# Patient Record
Sex: FEMALE | Race: WHITE | HISPANIC OR LATINO
[De-identification: names, ages, dates, MRNs, and addresses within clinical notes are randomized per-mention and may not be internally consistent; named-entity substitution may affect disease eponyms.]

---

## 2022-01-05 PROBLEM — Z00.00 ENCOUNTER FOR PREVENTIVE HEALTH EXAMINATION: Status: ACTIVE | Noted: 2022-01-05

## 2022-01-14 ENCOUNTER — APPOINTMENT (OUTPATIENT)
Dept: SPINE | Facility: CLINIC | Age: 72
End: 2022-01-14
Payer: MEDICAID

## 2022-01-14 VITALS
HEIGHT: 66 IN | BODY MASS INDEX: 30.86 KG/M2 | HEART RATE: 75 BPM | SYSTOLIC BLOOD PRESSURE: 125 MMHG | DIASTOLIC BLOOD PRESSURE: 82 MMHG | WEIGHT: 192 LBS | OXYGEN SATURATION: 93 %

## 2022-01-14 DIAGNOSIS — Z87.898 PERSONAL HISTORY OF OTHER SPECIFIED CONDITIONS: ICD-10-CM

## 2022-01-14 DIAGNOSIS — Z77.22 CONTACT WITH AND (SUSPECTED) EXPOSURE TO ENVIRONMENTAL TOBACCO SMOKE (ACUTE) (CHRONIC): ICD-10-CM

## 2022-01-14 DIAGNOSIS — G89.29 PAIN IN LEFT ANKLE AND JOINTS OF LEFT FOOT: ICD-10-CM

## 2022-01-14 DIAGNOSIS — Z86.79 PERSONAL HISTORY OF OTHER DISEASES OF THE CIRCULATORY SYSTEM: ICD-10-CM

## 2022-01-14 DIAGNOSIS — R20.0 ANESTHESIA OF SKIN: ICD-10-CM

## 2022-01-14 DIAGNOSIS — Z86.2 PERSONAL HISTORY OF DISEASES OF THE BLOOD AND BLOOD-FORMING ORGANS AND CERTAIN DISORDERS INVOLVING THE IMMUNE MECHANISM: ICD-10-CM

## 2022-01-14 DIAGNOSIS — Z87.19 PERSONAL HISTORY OF OTHER DISEASES OF THE DIGESTIVE SYSTEM: ICD-10-CM

## 2022-01-14 DIAGNOSIS — Z82.0 FAMILY HISTORY OF EPILEPSY AND OTHER DISEASES OF THE NERVOUS SYSTEM: ICD-10-CM

## 2022-01-14 DIAGNOSIS — Z82.3 FAMILY HISTORY OF STROKE: ICD-10-CM

## 2022-01-14 DIAGNOSIS — M25.572 PAIN IN LEFT ANKLE AND JOINTS OF LEFT FOOT: ICD-10-CM

## 2022-01-14 DIAGNOSIS — Z87.39 PERSONAL HISTORY OF OTHER DISEASES OF THE MUSCULOSKELETAL SYSTEM AND CONNECTIVE TISSUE: ICD-10-CM

## 2022-01-14 DIAGNOSIS — M54.16 RADICULOPATHY, LUMBAR REGION: ICD-10-CM

## 2022-01-14 DIAGNOSIS — Z86.19 PERSONAL HISTORY OF OTHER INFECTIOUS AND PARASITIC DISEASES: ICD-10-CM

## 2022-01-14 DIAGNOSIS — Z82.49 FAMILY HISTORY OF ISCHEMIC HEART DISEASE AND OTHER DISEASES OF THE CIRCULATORY SYSTEM: ICD-10-CM

## 2022-01-14 PROCEDURE — 99203 OFFICE O/P NEW LOW 30 MIN: CPT

## 2022-01-14 RX ORDER — ALENDRONATE SODIUM 70 MG/1
70 TABLET ORAL
Refills: 0 | Status: ACTIVE | COMMUNITY

## 2022-01-14 RX ORDER — NAPROXEN SODIUM 220 MG
220 TABLET ORAL
Refills: 0 | Status: ACTIVE | COMMUNITY

## 2022-01-14 NOTE — CONSULT LETTER
[Dear  ___] : Dear  [unfilled], [Please see my note below.] : Please see my note below. [FreeTextEntry2] : Dr. Bridget Sales [FreeTextEntry3] : Kinza Curry MS, ANP-BC, SCRN\par Board Certified Adult Nurse Practitioner on Behalf of Dr. Jass Lea\par  Neurosurgeon\par Department Neurosurgery\par \par

## 2022-01-14 NOTE — HISTORY OF PRESENT ILLNESS
[FreeTextEntry1] : Lowerback pain [de-identified] : Ms. Rony Mary is a 71 year old woman presenting with a PMHx osteoporosis, liver cirrhosis, splenomegaly, HepatitisC, PVD, thrombocytopenia, who comes for Lumbar spine evaluation. She She states that he symptoms started in 2019 with lowerback pain. She was evaluated at [facility] and seen and evaluated by Dr. Joaquim De La Rosa. He diagnosed her with L4-L5 Herniated disc and recommended conservative management. She underwent Steroid injection with relief for 6 months. In summer of 2020 her symptoms of lowerback pain returned and she delayed follow up until now. Today she reports persistent lowerback pain with intermittent numbness in her legs bilaterally worse on Left side that is most bothersome in the AM. The pain increases with Lifting and bending. and diminishes with sitting. She takes Advil as needed. In addition, she report left ankle pain and is concerned about a left ankle fracture due to an episode of banging her foot and a history of osteoporosis. She has not had PT within 6 weeks.\par \par She lives with her family and she  speaks Swedish. He daughter servers as the  for the visit. They refused  services\par She is currently working a Home attendant and reports that her job involves heavy lifting and strenuous activities.\par \par Her neurological exam is normal\par \par \par \par Difficulty walking up stairs\par \par No PT in the past 6 weeks\par \par Still working as work as Home health AID\par \par Pain better with lifting and bending\par Aleve for pain as needed\par \par

## 2022-01-14 NOTE — ASSESSMENT
[FreeTextEntry1] : Ms. Rony Mary is a 71 year old woman presenting with excerebration of chronic  lowerback pain with intermittent numbness in feet. Lumbar MRI to assess for Lumbar herniated disc/ Stenosis. Lumbar Xray to assess for instability. LEft ankle Xray to r/o Fracture.  She will begin PT for core strengthening exercise. She will return in 6 weeks to see Dr. Lea.\par

## 2022-01-14 NOTE — PHYSICAL EXAM
[General Appearance - Alert] : alert [General Appearance - In No Acute Distress] : in no acute distress [Oriented To Time, Place, And Person] : oriented to person, place, and time [Cranial Nerves Optic (II)] : visual acuity intact bilaterally,  pupils equal round and reactive to light [Cranial Nerves Oculomotor (III)] : extraocular motion intact [Cranial Nerves Trigeminal (V)] : facial sensation intact symmetrically [Cranial Nerves Facial (VII)] : face symmetrical [Cranial Nerves Vestibulocochlear (VIII)] : hearing was intact bilaterally [Cranial Nerves Glossopharyngeal (IX)] : tongue and palate midline [Cranial Nerves Accessory (XI - Cranial And Spinal)] : head turning and shoulder shrug symmetric [Cranial Nerves Hypoglossal (XII)] : there was no tongue deviation with protrusion [] : no respiratory distress [Respiration, Rhythm And Depth] : normal respiratory rhythm and effort [Heart Rate And Rhythm] : heart rate was normal and rhythm regular [Abnormal Walk] : normal gait [No Visual Abnormalities] : no visible abnormailities [Normal] : normal [Able to toe walk] : the patient was able to toe walk [Able to heel walk] : the patient was able to heel walk [Sclera] : the sclera and conjunctiva were normal [Outer Ear] : the ears and nose were normal in appearance [Neck Appearance] : the appearance of the neck was normal [Straight-Leg Raise Test - Left] : straight leg raise of the left leg was negative [Straight-Leg Raise Test - Right] : straight leg raise  of the right leg was negative

## 2022-02-03 ENCOUNTER — TRANSCRIPTION ENCOUNTER (OUTPATIENT)
Age: 72
End: 2022-02-03

## 2022-03-03 ENCOUNTER — APPOINTMENT (OUTPATIENT)
Dept: SPINE | Facility: CLINIC | Age: 72
End: 2022-03-03
Payer: MEDICAID

## 2022-03-03 VITALS
WEIGHT: 192 LBS | HEIGHT: 66 IN | DIASTOLIC BLOOD PRESSURE: 85 MMHG | SYSTOLIC BLOOD PRESSURE: 133 MMHG | BODY MASS INDEX: 30.86 KG/M2 | OXYGEN SATURATION: 93 % | HEART RATE: 80 BPM

## 2022-03-03 DIAGNOSIS — M54.9 DORSALGIA, UNSPECIFIED: ICD-10-CM

## 2022-03-03 DIAGNOSIS — G89.29 DORSALGIA, UNSPECIFIED: ICD-10-CM

## 2022-03-03 PROCEDURE — 99212 OFFICE O/P EST SF 10 MIN: CPT

## 2022-03-03 NOTE — DATA REVIEWED
[de-identified] : Lumbar spine from [facility] on 3/3/2022 [de-identified] : [facility]

## 2022-03-03 NOTE — ASSESSMENT
[FreeTextEntry1] : No surgical interventions needed. Recommend OTC pain medication after approval. If pain persist f/u with pain management specialist. PRN

## 2022-03-03 NOTE — REASON FOR VISIT
[Follow-Up: _____] : a [unfilled] follow-up visit [Other: _____] : [unfilled] [FreeTextEntry1] : Ms. Rony Mary is here today for with three year history of lower back pain. Denies pain radiating down legs or weakness. She had one LESI in 2017  MRI of Lumbar spine and Xray reviwed today. Flexion extension x-rays did not show any dynamic instability. The ankle x-rays did not show any acute traumatic fissures. A lumbar MRI scan reveals L4-5 stenosis however the patient has no leg pain and low back pain is mild. She's not taking any medication. She denies any numbness tingling or weakness, bowel or bladder dysfunction.\par \par She speaks [place] and refused  services. He daughter servers as the  for the visit.

## 2022-03-03 NOTE — PHYSICAL EXAM
[Motor Strength] : muscle strength was normal in all four extremities [Sensation Tactile Decrease] : light touch was intact [Abnormal Walk] : normal gait [Full ROM] : full ROM [No Pain with ROM] : no pain with motion in any direction [Straight-Leg Raise Test - Left] : straight leg raise of the left leg was negative [Straight-Leg Raise Test - Right] : straight leg raise  of the right leg was negative [Able to toe walk] : the patient was able to toe walk [Able to heel walk] : the patient was able to heel walk